# Patient Record
Sex: MALE | Race: WHITE | NOT HISPANIC OR LATINO | URBAN - METROPOLITAN AREA
[De-identification: names, ages, dates, MRNs, and addresses within clinical notes are randomized per-mention and may not be internally consistent; named-entity substitution may affect disease eponyms.]

---
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
## 2023-02-14 ENCOUNTER — OUTPATIENT (OUTPATIENT)
Dept: OUTPATIENT SERVICES | Age: 23
LOS: 1 days | End: 2023-02-14

## 2023-02-14 VITALS
TEMPERATURE: 98 F | DIASTOLIC BLOOD PRESSURE: 72 MMHG | HEART RATE: 107 BPM | RESPIRATION RATE: 18 BRPM | OXYGEN SATURATION: 100 % | HEIGHT: 62.2 IN | WEIGHT: 119.49 LBS | SYSTOLIC BLOOD PRESSURE: 119 MMHG

## 2023-02-14 VITALS — HEIGHT: 62.2 IN | WEIGHT: 119.49 LBS

## 2023-02-14 DIAGNOSIS — F41.9 ANXIETY DISORDER, UNSPECIFIED: ICD-10-CM

## 2023-02-14 DIAGNOSIS — Z92.89 PERSONAL HISTORY OF OTHER MEDICAL TREATMENT: Chronic | ICD-10-CM

## 2023-02-14 DIAGNOSIS — F84.0 AUTISTIC DISORDER: ICD-10-CM

## 2023-02-14 DIAGNOSIS — F91.9 CONDUCT DISORDER, UNSPECIFIED: ICD-10-CM

## 2023-02-14 NOTE — H&P PST ADULT - PROBLEM SELECTOR PLAN 1
Pt [place]orations and [place] 2/20/2023 [place] [facility] [place]

## 2023-02-14 NOTE — H&P PST ADULT - ASSESSMENT
22y male with history of autism, anxiety, dental caries, here for PST.  No evidence of acute illness or infection.   aware to notify Dr. Goddard's office if pt develops s/s of illness prior to surgery 22y male with history of autism, anxiety, dental caries, here for PST.  No evidence of acute illness or infection.  *Last dental procedure pt with severe n/v post anesthesia requiring anti-emetic as per mother.  Parents aware to notify Dr. Goddard's office if pt develops s/s of illness prior to surgery

## 2023-02-14 NOTE — H&P PST ADULT - REASON FOR ADMISSION
Pt is here for presurgical testing evaluation for restorations and extractions on 2/20/2023 with Dr. Goddard at [facility] [place]

## 2023-02-14 NOTE — H&P PST ADULT - COMMENTS
FHx:  Mother: Factor 12 deficiency (prolonged clotting)  Father:   Reports no family history of anesthesia complications or prolonged bleeding FHx:  Mother: Factor 12 deficiency (prolonged clotting), mother reported no excessive bleeding- severe n/v post anesthesia; hx of heavy menses, s/p hysterectomy  Father: no past medical or surgical history   Brother; 21yo, T&A, horseshoe kidney,   Reports no family history of anesthesia complications or prolonged bleeding

## 2023-02-14 NOTE — H&P PST ADULT - HISTORY OF PRESENT ILLNESS
22y male with history of autism, anxiety, dental caries, here for PST.  COVID PCR testing will be obtained after PST visit on.  No recent travel in the last two weeks outside of [place]. No known exposure to anyone with Covid-19 virus.  22y male with history of autism, anxiety, dental caries, here for PST.  COVID PCR testing will be obtained after PST visit. Parents will schedule appt at [facility].  No recent travel in the last two weeks outside of [place]. No known exposure to anyone with Covid-19 virus.

## 2023-02-17 RX ORDER — FOSAPREPITANT DIMEGLUMINE 150 MG/5ML
150 INJECTION, POWDER, LYOPHILIZED, FOR SOLUTION INTRAVENOUS ONCE
Refills: 0 | Status: DISCONTINUED | OUTPATIENT
Start: 2023-02-20 | End: 2023-03-07

## 2023-02-19 ENCOUNTER — TRANSCRIPTION ENCOUNTER (OUTPATIENT)
Age: 23
End: 2023-02-19

## 2023-02-20 ENCOUNTER — OUTPATIENT (OUTPATIENT)
Dept: OUTPATIENT SERVICES | Age: 23
LOS: 1 days | Discharge: HOME | End: 2023-02-20

## 2023-02-20 ENCOUNTER — TRANSCRIPTION ENCOUNTER (OUTPATIENT)
Age: 23
End: 2023-02-20

## 2023-02-20 VITALS
HEART RATE: 117 BPM | RESPIRATION RATE: 20 BRPM | OXYGEN SATURATION: 100 % | DIASTOLIC BLOOD PRESSURE: 60 MMHG | SYSTOLIC BLOOD PRESSURE: 99 MMHG

## 2023-02-20 VITALS
OXYGEN SATURATION: 99 % | DIASTOLIC BLOOD PRESSURE: 67 MMHG | HEART RATE: 119 BPM | RESPIRATION RATE: 16 BRPM | SYSTOLIC BLOOD PRESSURE: 95 MMHG | TEMPERATURE: 99 F

## 2023-02-20 DIAGNOSIS — F91.9 CONDUCT DISORDER, UNSPECIFIED: ICD-10-CM

## 2023-02-20 DIAGNOSIS — Z92.89 PERSONAL HISTORY OF OTHER MEDICAL TREATMENT: Chronic | ICD-10-CM

## 2023-02-20 DEVICE — SURGIFOAM PAD 8CM X 12.5CM X 2MM (100C): Type: IMPLANTABLE DEVICE | Status: FUNCTIONAL

## 2023-02-20 RX ORDER — MIDAZOLAM HYDROCHLORIDE 1 MG/ML
20 INJECTION, SOLUTION INTRAMUSCULAR; INTRAVENOUS ONCE
Refills: 0 | Status: DISCONTINUED | OUTPATIENT
Start: 2023-02-20 | End: 2023-02-20

## 2023-02-20 RX ORDER — ONDANSETRON 8 MG/1
4 TABLET, FILM COATED ORAL ONCE
Refills: 0 | Status: DISCONTINUED | OUTPATIENT
Start: 2023-02-20 | End: 2023-02-20

## 2023-02-20 RX ORDER — IBUPROFEN 200 MG
10 TABLET ORAL
Qty: 0 | Refills: 0 | DISCHARGE
Start: 2023-02-20

## 2023-02-20 RX ORDER — FENTANYL CITRATE 50 UG/ML
25 INJECTION INTRAVENOUS
Refills: 0 | Status: DISCONTINUED | OUTPATIENT
Start: 2023-02-20 | End: 2023-02-20

## 2023-02-20 RX ORDER — IBUPROFEN 200 MG
400 TABLET ORAL EVERY 6 HOURS
Refills: 0 | Status: DISCONTINUED | OUTPATIENT
Start: 2023-02-20 | End: 2023-03-07

## 2023-02-20 RX ADMIN — MIDAZOLAM HYDROCHLORIDE 20 MILLIGRAM(S): 1 INJECTION, SOLUTION INTRAMUSCULAR; INTRAVENOUS at 15:26

## 2023-02-20 NOTE — ASU DISCHARGE PLAN (ADULT/PEDIATRIC) - NS MD DC FALL RISK RISK
For information on Fall & Injury Prevention, visit: [email or web address] OR  [email or web address] OR  https://www.cdc.gov/steadi/patient.html

## 2023-02-20 NOTE — ASU DISCHARGE PLAN (ADULT/PEDIATRIC) - ASU DC SPECIAL INSTRUCTIONSFT
Discharge Instructions:    Procedure: Dental Rehabilitation    Diet:  	Anesthesia can cause nausea and decreased appetite; however, it is very important that your child stays hydrated. Offer clear liquids (apple juice, soup, etc) first and then, if that is tolerated, move to soft foods. Small drinks taken repeatedly are preferable to taking large amounts in single sitting.  Soft, bland food (not too hot) may be taken when desired.  If vomiting occurs, discontinue all food and water for 1 – 2 hours then begin again with small amounts of clear fluids.     Activity:   	Your child should rest at home the day of the surgery and should only play inside and away from stairs. Do not let your child climb stairs alone. Your child may sleep for several hours following the procedure.  You may allow your child to sleep but check for normal sleep pattern, (breathing, position, temperature, etc.). Your child should be awake for eating and drinking. Your child may return to normal activities (including school or ) the day after the surgery.     Mouth care:  	You should brush and floss your child’s teeth gently but thoroughly starting tonight. Any silver caps or spacers should also be brushed to prevent gum inflammation. Avoid consumption of sticky or chewy candy until baby teeth fall out as this can loosen the silver caps/spacers.    Bleeding:  	It is normal to have some oozing (minor bleeding) after this procedure. Biting on (or applying pressure with) cotton gauze for 15-20 minutes will be sufficient to control most oral bleeding. There may be a small amount of pinkish drainage from the mouth (such as a pink spot on the pillow in the morning). This is normal as it is a few drops of blood mixed in the saliva. If teeth were extracted, avoid rinsing forcefully for 24 hours or using a straw to prevent more bleeding.     Follow up:  	Please call the office today or tomorrow to schedule a post-operative check-up in 2 weeks. Your child should continue to go to the dentist every 3-6 months for routine and preventive care.     IV Site:  	For pink color or tenderness on skin, use a warm clean, moist washcloth. Place over area for 10 minutes. Do this 2-3 times a day. For red, firm, warm, swollen, painful and/or with drainage, call your physician.     Temperature Elevation:  Your child’s temperature may be elevated to 101 degrees F (38 degrees C) for the first twenty-four hours after treatment.  Taking Children’s Tylenol every 4-6 hours as directed and fluids will help alleviate this condition.  For a temperature above 101 degrees F (38 degrees C) or if this temperature lasts longer than 24 hours, call the hospital and have them page the Dental Resident.    If you have any questions or problems, please call [phone] to reach the resident on call.

## 2023-02-20 NOTE — ASU PATIENT PROFILE, ADULT - VISION (WITH CORRECTIVE LENSES IF THE PATIENT USUALLY WEARS THEM):
Dr José Miguel Blanco  At bedside  ekg completed  Dr shiv Conteh, RN  06/14/20 1952 Normal vision: sees adequately in most situations; can see medication labels, newsprint

## 2024-01-31 NOTE — ASU PATIENT PROFILE, ADULT - AS SC BRADEN FRICTION
Addended by: JAMES MAYA on: 1/31/2024 03:55 PM     Modules accepted: Level of Service    
(3) no apparent problem

## 2024-04-01 PROBLEM — F41.9 ANXIETY DISORDER, UNSPECIFIED: Chronic | Status: ACTIVE | Noted: 2023-02-14

## 2024-04-01 PROBLEM — Z00.00 ENCOUNTER FOR PREVENTIVE HEALTH EXAMINATION: Status: ACTIVE | Noted: 2024-04-01

## 2024-04-01 PROBLEM — F84.0 AUTISTIC DISORDER: Chronic | Status: ACTIVE | Noted: 2023-02-14

## 2024-04-01 PROBLEM — K02.9 DENTAL CARIES, UNSPECIFIED: Chronic | Status: ACTIVE | Noted: 2023-02-14

## 2024-10-15 ENCOUNTER — APPOINTMENT (OUTPATIENT)
Dept: INTERNAL MEDICINE | Facility: CLINIC | Age: 24
End: 2024-10-15
Payer: COMMERCIAL

## 2024-10-15 VITALS
WEIGHT: 140 LBS | DIASTOLIC BLOOD PRESSURE: 78 MMHG | SYSTOLIC BLOOD PRESSURE: 100 MMHG | HEIGHT: 64 IN | RESPIRATION RATE: 14 BRPM | OXYGEN SATURATION: 95 % | BODY MASS INDEX: 23.9 KG/M2 | HEART RATE: 107 BPM

## 2024-10-15 DIAGNOSIS — Z28.21 IMMUNIZATION NOT CARRIED OUT BECAUSE OF PATIENT REFUSAL: ICD-10-CM

## 2024-10-15 DIAGNOSIS — Z83.438 FAMILY HISTORY OF OTHER DISORDER OF LIPOPROTEIN METABOLISM AND OTHER LIPIDEMIA: ICD-10-CM

## 2024-10-15 DIAGNOSIS — B00.1 HERPESVIRAL VESICULAR DERMATITIS: ICD-10-CM

## 2024-10-15 DIAGNOSIS — F41.9 ANXIETY DISORDER, UNSPECIFIED: ICD-10-CM

## 2024-10-15 DIAGNOSIS — F45.8 OTHER SOMATOFORM DISORDERS: ICD-10-CM

## 2024-10-15 DIAGNOSIS — Z00.00 ENCOUNTER FOR GENERAL ADULT MEDICAL EXAMINATION W/OUT ABNORMAL FINDINGS: ICD-10-CM

## 2024-10-15 DIAGNOSIS — Z82.49 FAMILY HISTORY OF ISCHEMIC HEART DISEASE AND OTHER DISEASES OF THE CIRCULATORY SYSTEM: ICD-10-CM

## 2024-10-15 DIAGNOSIS — Z83.79 FAMILY HISTORY OF OTHER DISEASES OF THE DIGESTIVE SYSTEM: ICD-10-CM

## 2024-10-15 DIAGNOSIS — S00.12XA CONTUSION OF LEFT EYELID AND PERIOCULAR AREA, INITIAL ENCOUNTER: ICD-10-CM

## 2024-10-15 DIAGNOSIS — F84.0 AUTISTIC DISORDER: ICD-10-CM

## 2024-10-15 DIAGNOSIS — Z78.9 OTHER SPECIFIED HEALTH STATUS: ICD-10-CM

## 2024-10-15 PROCEDURE — 99385 PREV VISIT NEW AGE 18-39: CPT

## 2024-10-15 RX ORDER — MAGNESIUM OXIDE/MAG AA CHELATE 300 MG
CAPSULE ORAL
Refills: 0 | Status: ACTIVE | COMMUNITY

## 2025-02-04 DIAGNOSIS — B35.1 TINEA UNGUIUM: ICD-10-CM

## 2025-02-04 RX ORDER — KETOCONAZOLE 20 MG/G
2 CREAM TOPICAL DAILY
Qty: 1 | Refills: 3 | Status: ACTIVE | COMMUNITY
Start: 2025-02-04 | End: 1900-01-01

## (undated) DEVICE — DRAPE TOWEL BLUE 17" X 24"

## (undated) DEVICE — BASIN SET DOUBLE

## (undated) DEVICE — TUBING SUCTION NONCONDUCTIVE 6MM X 12FT

## (undated) DEVICE — PACKING GAUZE PLAIN 1"

## (undated) DEVICE — POOLE SUCTION TIP

## (undated) DEVICE — POSITIONER PATIENT SAFETY STRAP 3X60"

## (undated) DEVICE — DRSG KLING 2"

## (undated) DEVICE — DRAPE CAMERA ENDOMATE

## (undated) DEVICE — GLV 7 PROTEXIS (WHITE)

## (undated) DEVICE — LABELS BLANK W PEN

## (undated) DEVICE — SUCTION YANKAUER OPEN TIP NO VENT CURVE

## (undated) DEVICE — GOWN XL

## (undated) DEVICE — DRAPE 3/4 SHEET 52X76"

## (undated) DEVICE — VENODYNE/SCD SLEEVE CALF PEDS

## (undated) DEVICE — DRAPE INSTRUMENT POUCH 6.75" X 11"

## (undated) DEVICE — DRAPE LIGHT HANDLE COVER (GREEN)

## (undated) DEVICE — DRAPE SPLIT SHEET 77" X 120"

## (undated) DEVICE — DRSG CURITY GAUZE SPONGE 4 X 4" 12-PLY

## (undated) DEVICE — DRAPE SURGICAL #1010

## (undated) DEVICE — DRAPE MAGNETIC INSTRUMENT MEDIUM

## (undated) DEVICE — MEDICATION LABELS AND PEN

## (undated) DEVICE — DRAPE BACK TABLE COVER 44X90"

## (undated) DEVICE — DRAPE MAYO STAND 30"

## (undated) DEVICE — POSITIONER STRAP ARMBOARD VELCRO TS-30

## (undated) DEVICE — ELCTR GROUNDING PAD ADULT COVIDIEN

## (undated) DEVICE — WARMING BLANKET FULL PEDS